# Patient Record
Sex: FEMALE | Race: BLACK OR AFRICAN AMERICAN | NOT HISPANIC OR LATINO | URBAN - METROPOLITAN AREA
[De-identification: names, ages, dates, MRNs, and addresses within clinical notes are randomized per-mention and may not be internally consistent; named-entity substitution may affect disease eponyms.]

---

## 2018-11-03 ENCOUNTER — OUTPATIENT (OUTPATIENT)
Dept: OUTPATIENT SERVICES | Facility: HOSPITAL | Age: 18
LOS: 1 days | End: 2018-11-03
Payer: COMMERCIAL

## 2018-11-03 ENCOUNTER — EMERGENCY (EMERGENCY)
Facility: HOSPITAL | Age: 18
LOS: 1 days | Discharge: HOME | End: 2018-11-03
Attending: EMERGENCY MEDICINE
Payer: COMMERCIAL

## 2018-11-03 VITALS
TEMPERATURE: 98 F | HEIGHT: 63 IN | OXYGEN SATURATION: 95 % | SYSTOLIC BLOOD PRESSURE: 108 MMHG | HEART RATE: 93 BPM | DIASTOLIC BLOOD PRESSURE: 75 MMHG | RESPIRATION RATE: 20 BRPM | WEIGHT: 111.99 LBS

## 2018-11-03 DIAGNOSIS — O26.899 OTHER SPECIFIED PREGNANCY RELATED CONDITIONS, UNSPECIFIED TRIMESTER: ICD-10-CM

## 2018-11-03 DIAGNOSIS — Z3A.00 WEEKS OF GESTATION OF PREGNANCY NOT SPECIFIED: ICD-10-CM

## 2018-11-03 PROCEDURE — 73140 X-RAY EXAM OF FINGER(S): CPT | Mod: 26,59,RT

## 2018-11-03 PROCEDURE — G0463: CPT

## 2018-11-03 PROCEDURE — 73140 X-RAY EXAM OF FINGER(S): CPT

## 2018-11-03 PROCEDURE — 99283 EMERGENCY DEPT VISIT LOW MDM: CPT

## 2018-11-03 PROCEDURE — 73130 X-RAY EXAM OF HAND: CPT | Mod: 26,RT

## 2018-11-03 PROCEDURE — 99284 EMERGENCY DEPT VISIT MOD MDM: CPT

## 2018-11-03 PROCEDURE — 73130 X-RAY EXAM OF HAND: CPT

## 2018-11-03 RX ORDER — ACETAMINOPHEN 500 MG
975 TABLET ORAL ONCE
Refills: 0 | Status: COMPLETED | OUTPATIENT
Start: 2018-11-03 | End: 2018-11-03

## 2018-11-03 RX ADMIN — Medication 975 MILLIGRAM(S): at 16:56
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
## 2018-11-03 NOTE — ED PROVIDER NOTE - PHYSICAL EXAMINATION
NAD, VSS, Afebrile, No facial or scalp tender, No spinal tender, NO rib or CVA tender, ABD soft, non tender, FHR- 153, No pelvic or hip tender. + Left hand dorsum superficial abrasion with mild tender, + Left 5th finger tip abrasion without active bleeding. full ROMs of the hand and fingers. N/V- intact.

## 2018-11-03 NOTE — ED ADULT NURSE NOTE - NSIMPLEMENTINTERV_GEN_ALL_ED
Implemented All Universal Safety Interventions:  [place] to call system. Call bell, personal items and telephone within reach. Instruct patient to call for assistance. Room bathroom lighting operational. Non-slip footwear when patient is off stretcher. Physically safe environment: no spills, clutter or unnecessary equipment. Stretcher in lowest position, wheels locked, appropriate side rails in place.

## 2019-01-14 ENCOUNTER — ASOB RESULT (OUTPATIENT)
Age: 19
End: 2019-01-14

## 2019-01-14 ENCOUNTER — APPOINTMENT (OUTPATIENT)
Dept: ANTEPARTUM | Facility: CLINIC | Age: 19
End: 2019-01-14
Payer: COMMERCIAL

## 2019-01-14 PROBLEM — Z00.00 ENCOUNTER FOR PREVENTIVE HEALTH EXAMINATION: Status: ACTIVE | Noted: 2019-01-14

## 2019-01-14 PROCEDURE — 76811 OB US DETAILED SNGL FETUS: CPT

## 2019-01-15 ENCOUNTER — TRANSCRIPTION ENCOUNTER (OUTPATIENT)
Age: 19
End: 2019-01-15

## 2020-12-09 NOTE — ED PROVIDER NOTE - RESPIRATORY NEGATIVE STATEMENT, MLM
no chest pain, no cough, and no shortness of breath. Xerosis Aggressive Treatment: I recommended application of Cetaphil or CeraVe numerous times a day going to bed to all dry areas. I also prescribed a topical steroid for twice daily use.

## 2021-05-17 NOTE — ED ADULT NURSE NOTE - ISOLATION TYPE:
UPPER ENDOSCOPY or EGD    You have been scheduled for your egd with Dr. Morris. A Pre-Op nurse will contact you a few days prior to your procedure to get you pre-registered.     Upper Endoscopy or EGD is an examination of your esophagus, stomach, and small bowel with the use of an endoscope.     Please follow the steps below to ensure a complete and safe procedure:    If you are taking the diet drug, Phentermine, you must stop taking this drug 14 days prior to the procedure.     SEVEN (7) DAYS BEFORE THE PROCEDURE:  Do not take any Plavix, Aggrenox, Pepto-Bismol or iron supplements. If you have any questions or concerns about holding any of these medications, please contact your cardiologist or primary care physician.    THREE (3) DAYS BEFORE THE PROCEDURE:  Do not take any Coumadin (warfarin). Please contact your cardiologist or primary care physician for any questions or concerns regarding holding this medication.    If you take a scheduled Aspirin daily, you do not need to hold it prior to the procedure.      · Make arrangements for someone to drive you home after the procedure because you will be very drowsy. Please make these arrangements in advance. A taxi is not acceptable transportation. If you do not have transportation arranged, we will not be able to do the procedure.    · Due to everyone's busy schedules, it is important that you keep your appointment.  If you must reschedule or cancel, please notify your physician's office at least 48 hours in advance.    DAY BEFORE THE PROCEDURE:  · You may eat normally throughout the day.  · Do not eat or drink anything after midnight.    THE MORNING OF THE PROCEDURE:    · Do not eat or drink anything until after the procedure.  · Take your heart and blood pressure medication with a sip of water. If possible, please wait to take your remaining medication until after your procedure.    If you have any questions, please feel free to contact Pre-admit at  [phone].      Thank you for entrusting your care with [facility].   None